# Patient Record
Sex: FEMALE | Race: OTHER | HISPANIC OR LATINO
[De-identification: names, ages, dates, MRNs, and addresses within clinical notes are randomized per-mention and may not be internally consistent; named-entity substitution may affect disease eponyms.]

---

## 2022-11-29 ENCOUNTER — APPOINTMENT (OUTPATIENT)
Dept: OTOLARYNGOLOGY | Facility: CLINIC | Age: 62
End: 2022-11-29

## 2022-11-29 VITALS — BODY MASS INDEX: 24.16 KG/M2 | WEIGHT: 145 LBS | HEIGHT: 65 IN | TEMPERATURE: 96.3 F

## 2022-11-29 DIAGNOSIS — J00 ACUTE NASOPHARYNGITIS [COMMON COLD]: ICD-10-CM

## 2022-11-29 DIAGNOSIS — Z86.19 PERSONAL HISTORY OF OTHER INFECTIOUS AND PARASITIC DISEASES: ICD-10-CM

## 2022-11-29 DIAGNOSIS — Z87.09 PERSONAL HISTORY OF OTHER DISEASES OF THE RESPIRATORY SYSTEM: ICD-10-CM

## 2022-11-29 DIAGNOSIS — Z86.39 PERSONAL HISTORY OF OTHER ENDOCRINE, NUTRITIONAL AND METABOLIC DISEASE: ICD-10-CM

## 2022-11-29 DIAGNOSIS — Z82.5 FAMILY HISTORY OF ASTHMA AND OTHER CHRONIC LOWER RESPIRATORY DISEASES: ICD-10-CM

## 2022-11-29 PROCEDURE — 99204 OFFICE O/P NEW MOD 45 MIN: CPT | Mod: 25

## 2022-11-29 PROCEDURE — 92550 TYMPANOMETRY & REFLEX THRESH: CPT | Mod: 52

## 2022-11-29 PROCEDURE — 92557 COMPREHENSIVE HEARING TEST: CPT

## 2022-11-29 PROCEDURE — 31231 NASAL ENDOSCOPY DX: CPT

## 2022-11-29 RX ORDER — FLUTICASONE PROPIONATE 50 UG/1
50 SPRAY, METERED NASAL DAILY
Qty: 1 | Refills: 3 | Status: ACTIVE | COMMUNITY
Start: 2022-11-29 | End: 1900-01-01

## 2022-11-29 RX ORDER — IBANDRONATE SODIUM 150 MG/1
150 TABLET ORAL
Refills: 0 | Status: ACTIVE | COMMUNITY

## 2022-11-29 NOTE — HISTORY OF PRESENT ILLNESS
[de-identified] : DAVID CORREA is a 62 year old patient with a 1 week history of right ear pressure and sensation of eustachian tube dysfunction.  She had COVID about 2 weeks ago.  She had a lot of nasal congestion and ear pressure.  She flew about 1 week ago.  She has nasal congestion and ear pressure which remains unchanged.  She has no sinus pain.  She has no otalgia, otorrhea, or dizziness.  She tried using a decongestant but it made her feel worse.  She has not used a nasal steroid spray.  She may have had something similar in the past.  She had recurrent ear infections in the past no history of prior otologic surgery or ear trauma.  She had a hearing test about 5 years ago.

## 2022-11-29 NOTE — CONSULT LETTER
[Dear  ___] : Dear  [unfilled], [Consult Letter:] : I had the pleasure of evaluating your patient, [unfilled]. [Please see my note below.] : Please see my note below. [Consult Closing:] : Thank you very much for allowing me to participate in the care of this patient.  If you have any questions, please do not hesitate to contact me. [Sincerely,] : Sincerely, [FreeTextEntry3] : Joan Kaur MD\par

## 2022-11-29 NOTE — ASSESSMENT
[FreeTextEntry1] : She has a right middle ear effusion and eustachian tube dysfunction.  She had COVID recently and then flew.  Audiogram showed a conductive hearing loss on that side.  She has negative pressure on the left side but it is within normal limits.  Nasal endoscopy showed mild nasal mucosal edema but no purulent drainage or congestion to suggest a sinus infection.\par \par Plan\par -Findings and management options were discussed with the patient.\par -Good ear hygiene reviewed\par -Monitor hearing\par -We discussed her options which include observation with medical therapy, a trial of oral steroids, and myringotomy.  I discussed the pros and cons of each.  She is going to try a short burst of prednisone.  I reviewed some of the associated risks as to the risk of mood changes, GI complications, and bone issues/fractures.  She was asked to take the medication with food.  If she has any GI issues, she should also consider taking Pepcid.  It sounds like she had been on steroids in the past for similar problem\par -I recommended a trial of a nasal steroid spray\par -I asked her to follow-up in approximately 2 weeks.  We will see if the effusion resolves.  If she would like to proceed with the myringotomy prior to that, I asked her to call me.\par -Call or return earlier if she has any concerns or worsening symptoms

## 2022-12-13 ENCOUNTER — APPOINTMENT (OUTPATIENT)
Dept: OTOLARYNGOLOGY | Facility: CLINIC | Age: 62
End: 2022-12-13

## 2022-12-13 VITALS — HEIGHT: 65 IN | BODY MASS INDEX: 24.16 KG/M2 | WEIGHT: 145 LBS | TEMPERATURE: 97 F

## 2022-12-13 DIAGNOSIS — H93.299 OTHER ABNORMAL AUDITORY PERCEPTIONS, UNSPECIFIED EAR: ICD-10-CM

## 2022-12-13 DIAGNOSIS — H65.01 ACUTE SEROUS OTITIS MEDIA, RIGHT EAR: ICD-10-CM

## 2022-12-13 DIAGNOSIS — H90.11 CONDUCTIVE HEARING LOSS, UNILATERAL, RIGHT EAR, WITH UNRESTRICTED HEARING ON THE CONTRALATERAL SIDE: ICD-10-CM

## 2022-12-13 DIAGNOSIS — H69.83 OTHER SPECIFIED DISORDERS OF EUSTACHIAN TUBE, BILATERAL: ICD-10-CM

## 2022-12-13 PROCEDURE — 92557 COMPREHENSIVE HEARING TEST: CPT

## 2022-12-13 PROCEDURE — 92567 TYMPANOMETRY: CPT

## 2022-12-13 PROCEDURE — 99213 OFFICE O/P EST LOW 20 MIN: CPT

## 2022-12-13 RX ORDER — PREDNISONE 10 MG/1
10 TABLET ORAL
Qty: 12 | Refills: 0 | Status: COMPLETED | COMMUNITY
Start: 2022-11-29 | End: 2022-12-13

## 2022-12-13 NOTE — CONSULT LETTER
[Dear  ___] : Dear  [unfilled], [Courtesy Letter:] : I had the pleasure of seeing your patient, [unfilled], in my office today. [Please see my note below.] : Please see my note below. [Consult Closing:] : Thank you very much for allowing me to participate in the care of this patient.  If you have any questions, please do not hesitate to contact me. [Sincerely,] : Sincerely, [FreeTextEntry3] : Joan Kaur MD\par

## 2022-12-13 NOTE — HISTORY OF PRESENT ILLNESS
[de-identified] : DAVID CORREA is a 62 year old patient here for follow-up for right eustachian tube dysfunction with middle ear effusion.  It started after she flew following COVID.  She took the oral steroids.  She feels better.  She has no otalgia or otorrhea.  The nasal congestion has also improved\par \par She had recurrent ear infections in the past but no history of prior otologic surgery or ear trauma\par Nasal endoscopy and flexible laryngoscopy unremarkable

## 2022-12-13 NOTE — ASSESSMENT
[FreeTextEntry1] : She had a right middle ear effusion and eustachian tube dysfunction which has improved following oral steroids.  Her ears look normal on exam.  Repeat audiogram showed normal hearing and middle ear pressures\par \par Plan\par -Findings and management options were discussed with the patient.\par -continue good ear hygiene reviewed\par -Noise precautions\par -Monitor hearing\par -Nasal steroid spray or decongestant as needed.   \par -I will see her back if she has recurrent symptoms or any concerns

## 2022-12-14 PROBLEM — H93.299 ABNORMAL AUDITORY PERCEPTION: Status: ACTIVE | Noted: 2022-11-30
